# Patient Record
Sex: MALE
[De-identification: names, ages, dates, MRNs, and addresses within clinical notes are randomized per-mention and may not be internally consistent; named-entity substitution may affect disease eponyms.]

---

## 2018-06-18 ENCOUNTER — HOSPITAL ENCOUNTER (EMERGENCY)
Dept: HOSPITAL 90 - EDH | Age: 28
Discharge: HOME | End: 2018-06-18
Payer: COMMERCIAL

## 2018-06-18 DIAGNOSIS — Z72.0: ICD-10-CM

## 2018-06-18 DIAGNOSIS — S13.4XXA: Primary | ICD-10-CM
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
## 2018-06-18 DIAGNOSIS — S23.3XXA: ICD-10-CM
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
## 2018-06-18 DIAGNOSIS — I10: ICD-10-CM
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
## 2018-06-18 DIAGNOSIS — S60.221A: ICD-10-CM

## 2018-06-18 DIAGNOSIS — S00.03XA: ICD-10-CM
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
## 2018-06-18 PROCEDURE — 96372 THER/PROPH/DIAG INJ SC/IM: CPT

## 2018-06-18 PROCEDURE — 99284 EMERGENCY DEPT VISIT MOD MDM: CPT

## 2018-06-18 PROCEDURE — 72040 X-RAY EXAM NECK SPINE 2-3 VW: CPT

## 2018-06-18 PROCEDURE — 70450 CT HEAD/BRAIN W/O DYE: CPT

## 2018-06-18 PROCEDURE — 72070 X-RAY EXAM THORAC SPINE 2VWS: CPT

## 2018-06-18 PROCEDURE — 73130 X-RAY EXAM OF HAND: CPT

## 2018-06-18 PROCEDURE — 70100 X-RAY EXAM OF JAW <4VIEWS: CPT

## 2018-06-18 PROCEDURE — 73562 X-RAY EXAM OF KNEE 3: CPT

## 2020-04-30 ENCOUNTER — HOSPITAL ENCOUNTER (EMERGENCY)
Dept: HOSPITAL 90 - EDH | Age: 30
Discharge: HOME | End: 2020-04-30
Payer: COMMERCIAL

## 2020-04-30 DIAGNOSIS — R05: ICD-10-CM

## 2020-04-30 DIAGNOSIS — M79.10: ICD-10-CM

## 2020-04-30 DIAGNOSIS — J02.9: ICD-10-CM

## 2020-04-30 DIAGNOSIS — F41.1: Primary | ICD-10-CM

## 2023-01-17 ENCOUNTER — HOSPITAL ENCOUNTER (EMERGENCY)
Dept: HOSPITAL 90 - EDH | Age: 33
Discharge: HOME | End: 2023-01-17
Payer: COMMERCIAL

## 2023-01-17 VITALS — DIASTOLIC BLOOD PRESSURE: 64 MMHG | SYSTOLIC BLOOD PRESSURE: 118 MMHG

## 2023-01-17 VITALS — BODY MASS INDEX: 28.17 KG/M2 | HEIGHT: 65 IN | WEIGHT: 169.09 LBS

## 2023-01-17 DIAGNOSIS — L02.31: Primary | ICD-10-CM

## 2023-01-17 PROCEDURE — 96372 THER/PROPH/DIAG INJ SC/IM: CPT

## 2023-01-17 PROCEDURE — 10060 I&D ABSCESS SIMPLE/SINGLE: CPT

## 2023-01-17 PROCEDURE — 99283 EMERGENCY DEPT VISIT LOW MDM: CPT

## 2023-01-17 RX ADMIN — SULFAMETHOXAZOLE AND TRIMETHOPRIM SCH TAB: 800; 160 TABLET ORAL at 20:44

## 2023-01-17 RX ADMIN — KETOROLAC TROMETHAMINE ONE MG: 30 INJECTION, SOLUTION INTRAMUSCULAR; INTRAVENOUS at 20:44

## 2025-03-11 ENCOUNTER — HOSPITAL ENCOUNTER (EMERGENCY)
Dept: HOSPITAL 90 - EDH | Age: 35
Discharge: HOME | End: 2025-03-11
Payer: COMMERCIAL

## 2025-03-11 VITALS
HEART RATE: 72 BPM | RESPIRATION RATE: 18 BRPM | TEMPERATURE: 97.5 F | DIASTOLIC BLOOD PRESSURE: 87 MMHG | SYSTOLIC BLOOD PRESSURE: 126 MMHG | OXYGEN SATURATION: 98 %

## 2025-03-11 VITALS — HEIGHT: 65 IN | WEIGHT: 154.98 LBS | BODY MASS INDEX: 25.82 KG/M2

## 2025-03-11 DIAGNOSIS — M25.512: Primary | ICD-10-CM

## 2025-03-11 DIAGNOSIS — I10: ICD-10-CM

## 2025-03-11 PROCEDURE — 99283 EMERGENCY DEPT VISIT LOW MDM: CPT

## 2025-03-11 PROCEDURE — 73030 X-RAY EXAM OF SHOULDER: CPT

## 2025-03-11 NOTE — [facility]
LEFT SHOULDER RADIOGRAPHS - 2-3 VIEWS



INDICATION: Pain



COMPARISON: 5/30/2024



FINDINGS: 



No evidence for acute fracture or dislocation.



Acromioclavicular and glenohumeral alignments are well maintained.



Visible portions of the left clavicle are intact. 



Stable lateral left clavicular fixation plate and screws.



IMPRESSION:



No evidence for fracture or dislocation.

## 2025-03-11 NOTE — ERN
General


Chief Complaint:  Shoulder Injury/Pain


Stated Complaint:  LT SHOULDER PAIN


Time Seen by MD:  18:04


Time Seen by Midlevel:  18:04


Source:  patient





History of Present Illness


Initial Comments


Patient is a 34 year male being brought in by Alina naidu for 

evaluation of a left shoulder injury.  Patient states he was asleep in his left 

shoulder popped out of place.  Reports a previous surgery to his left shoulder. 

Denies any direct injury or trauma.


Allergies:  


Coded Allergies:  


     No Known Allergies (Unverified  Allergy, Unknown, 23)


Home Meds


Active Scripts


Ibuprofen (Ibuprofen 800 mg Tab) 800 Mg Tab, 800 MG PO Q8H PRN for fever or 

pain, #30 TAB 0 Refills


   Prov:NABIL NYE NP         24


Ibuprofen (Ibuprofen) 600 Mg Tablet, 600 MG PO Q6H PRN for PAIN, #15 TAB


   Prov:FITTINGRICKEYP         23


Sulfamethoxazole/Trimethoprim (Bactrim Ds Tablet) 1 Each Tablet, 1 TAB PO BID 

for 7 Days, #14 TAB 0 Refills


   Prov:FITTINGRICKEYP         23





Past Medical History


Past Medical History:  Hypertension


Past Surgical History:  Other


Surgical History Other:  L SHOULDER





ROS Dictation


CONSTITUTIONAL:  Negative except for HPI


HEAD/FACE:  Negative except for HPI


EENT:  Negative except for HPI


RESPIRATORY: Negative except for HPI


GASTROINTESTINAL/ABDOMINAL:   Negative except for HPI


GENITOURINARY: Negative except for HPI


MUSCULOSKELETAL: Negative except for HPI


INTEGUMENTARY:  Negative except for HPI


NEUROLOGICAL/PSYCH: Negative except for HPI


HEMATOLOGIC/LYMPHATIC:  Negative except for HPI





All Systems Negative, Except as noted above.





13 point review of systems assessed and all negative except for above.





Physical Exam


Physical Exam Dictation


Vital Signs reviewed 


General Appearance: Alert, oriented x 3, no acute distress, well developed, 

nourished. 


Head and Face: non-traumatic.


Eyes: PERRL, pink conjunctivas, eyelid no trauma, anterior chamber with arcus 

senilis. 


Ears: Pinnas intact and no signs of trauma or erythema ear canals clear and no 

discharge TM no erythema 


Nose: No discharge, no bleeding. 


Oropharynx: Mouth normal, tongue pink, 


pharynx clear,no erythema, tonsils no exudates, no abscesses noted,  mucous 

membrane moist 


Neck: Supple, non-tender, no thyromegaly, no masses, no JVD, no bruits 


Breast:Deferred 


Chest:No tenderness, no crepitus, no paradoxical movement, no retractions 


Lungs:Clear, well-ventilated, symmetric, no rales, no wheezing, no rhonchi, no 

stridor, good breath sounds bilaterally 


Heart: Regular rate, regular rhythm, no murmur, no gallops 


Vascular: no peripheral edema, 


Abdomen: Soft, positive bowel sounds, nondistended, no guarding, 


nontender, no rebound, no masses no hepatomegaly, no splenomegaly, no Anderson's 

sign, no hernias.


Rectal: Deferred


Genital: Deferred


Neurological: Normal speech,  motor function intact, sensory function intact 


Musculoskeletal: Neck nontender, full range of motion, back nontender, full 

range of motion,


Extremities: nontender, full range of motion 


Skin: Color pink, dry, no turgor, no rash, no lacerations, no abrasions, no 

contusions.


Lymphatic: Deferred





MDM


MDM: Patient is a 34 year male being brought in by Alina naidu for 

evaluation of a left shoulder injury.  Patient states he was asleep in his left 

shoulder popped out of place.  Reports a previous surgery to his left shoulder. 

Denies any direct injury or trauma.  On physical examination the patient is in 

handcuffs.  The handcuffs were removed from the left arm and the patient has 

normal passive range motion.  X-ray was obtained which does not show any 

evidence of an acute fracture or dislocation.  Patient will be discharged back 

to senior care.


Differential diagnosis:  Dislocation, contusion, fracture





There are no social concerns with this patient.





Prescription drug management


Prescriptions will include:  None





Medical management and examination interpretation discussions were had by me 

with other qualified healthcare professionals as indicated for the patient's 

care.


ED Course





Orders








Procedure Category Date Status





  Time 


 


Shoulder Comp 2+Vws Lt RAD 3/11/25 Resulted





  18:06 


 


Acetaminophen 500mg PHA 3/11/25 In Process





Tab (Tylenol 500mg T  19:00 








Current Medications








 Medications


  (Trade)  Dose


 Ordered  Sig/Jesica


 Route


 PRN Reason  Start Time


 Stop Time Status Last Admin


Dose Admin


 


 Acetaminophen


  (TYLenol 500MG


 TAB)  1,000 mg  ONCE  ONCE


 PO


   3/11/25 19:00


 3/11/25 19:01  3/11/25 18:52











Vital Signs








  Date Time  Temp Pulse Resp B/P (MAP) Pulse Ox O2 Delivery O2 Flow Rate FiO2


 


3/11/25 18:04 97.5 77 20 124/91 99 Room Air  





[facility]


                    [address] [place] [address] [phone]


                                        


                                 IMAGING REPORT


                                     Signed





PATIENT: MASSIMO ORANTES                                 MR#: [id number]


ACCOUNT#: [id number]                              : 1990


SEX: M AGE: 34                                      LOCATION: [facility]


ORDER DT: 25                             STATUS: REG ER


ACCESSION#: [id number]                            REPORT#: [id number]


SERVICE DT: 25





REASON: left shoulder injury


ORDERING PHYSICIAN: TYSON BENDER


PROCEDURE: SHOL 2V LT  -  SHOULDER COMP 2+VWS LT








LEFT SHOULDER RADIOGRAPHS - 2-3 VIEWS





INDICATION: Pain





COMPARISON: 2024





FINDINGS: 





No evidence for acute fracture or dislocation.





Acromioclavicular and glenohumeral alignments are well maintained.





Visible portions of the left clavicle are intact. 





Stable lateral left clavicular fixation plate and screws.





IMPRESSION:





No evidence for fracture or dislocation.











DICTATED BY: NICHOLAS CANO MD


DATE: 25





ELECTRONICALLY SIGNED BY: NICHOLAS CANO MD


DATE: 25








DX & DISP


Disposition:  Discharge


Departure


Impression:  


   Primary Impression:  Left shoulder pain


   Additional Impression:  Medical clearance for incarceration


Condition:  Stable





Additional Instructions:  


The x-ray of your left shoulder does not show any evidence of an acute fracture 

or dislocation.


Referrals:  


ABRAHAM ROSEN (PCP)


Time of Disposition:  18:43


I have reviewed the case, and I agree with, Diagnosis and Plan


I performed the substantive portion of the visit.  I have reviewed and 

personally made and approve the management plan that is documented in the note 

by myself or the JORDAN. I acknowledge for responsibility for the patient's 

management plan.











TYSON BENDER                Mar 11, 2025 18:45


NATALEE MCGOWAN DO                Mar 11, 2025 18:58